# Patient Record
Sex: FEMALE | URBAN - METROPOLITAN AREA
[De-identification: names, ages, dates, MRNs, and addresses within clinical notes are randomized per-mention and may not be internally consistent; named-entity substitution may affect disease eponyms.]

---

## 2023-05-02 ENCOUNTER — Encounter (OUTPATIENT)
Facility: LOCATION | Age: 68
End: 2023-05-02
Payer: MEDICARE

## 2023-05-02 ENCOUNTER — PROCEDURE (OUTPATIENT)
Facility: LOCATION | Age: 68
End: 2023-05-02
Payer: MEDICARE

## 2023-05-02 PROCEDURE — 68841 INSJ RX ELUT IMPLT LAC CANAL: CPT | Performed by: OPHTHALMOLOGY

## 2023-05-02 PROCEDURE — 66984 XCAPSL CTRC RMVL W/O ECP: CPT | Performed by: OPHTHALMOLOGY

## 2023-05-03 DIAGNOSIS — Z96.1 PRESENCE OF INTRAOCULAR LENS: Primary | ICD-10-CM

## 2023-05-03 PROCEDURE — 99024 POSTOP FOLLOW-UP VISIT: CPT | Performed by: OPTOMETRIST

## 2023-05-03 ASSESSMENT — INTRAOCULAR PRESSURE: OS: 17

## 2023-05-03 NOTE — IMPRESSION/PLAN
Impression: S/P CE/IOL Standard Monofocal Goal Low Myopia due to astigmatism Dextenza OS - 1 Day. Presence of intraocular lens  Z96.1.
 s/p CEIOL OD/OS surgery doing well. OS:05/02/2023 PCIOL centered and clear. (-) Siedel sign, (-) suture (+) BCL OS. Plan: Patient to continue Moxifloxacin TID, Prednisolone Q2h, and Prolensa QD. AT's PRN

RTC as scheduled for 1 week follow up with Dr. Cheri Parsons. RTC PRN decrease VA, increase pain, redness, discharge, photophobia, flashes/floaters or other vision problems. Patient reminded of post-operative restrictions (do not rub eye, do not bend over, do not  more than 10-15 lbs, do not sleep on side of surgery, do not get water, dust or dirt in eye, wear plastic shield while sleeping). Explained to patient that bcl is present and may fall out on it's own before next appointment, patient to still continue the use of post ops drops as directed. Advised patient to RTC if any discomfort or pain.

## 2023-05-08 ENCOUNTER — POST-OPERATIVE VISIT (OUTPATIENT)
Facility: LOCATION | Age: 68
End: 2023-05-08
Payer: MEDICARE

## 2023-05-08 DIAGNOSIS — H25.811 COMBINED FORMS OF AGE-RELATED CATARACT, RIGHT EYE: ICD-10-CM

## 2023-05-08 DIAGNOSIS — Z48.810 ENCOUNTER FOR SURGICAL AFTERCARE FOLLOWING SURGERY ON A SENSE ORGAN: Primary | ICD-10-CM

## 2023-05-08 DIAGNOSIS — Z96.1 PRESENCE OF INTRAOCULAR LENS: ICD-10-CM

## 2023-05-08 PROCEDURE — 99024 POSTOP FOLLOW-UP VISIT: CPT | Performed by: OPHTHALMOLOGY

## 2023-05-08 PROCEDURE — 92134 CPTRZ OPH DX IMG PST SGM RTA: CPT | Performed by: OPHTHALMOLOGY

## 2023-05-08 ASSESSMENT — VISUAL ACUITY: OS: 20/50

## 2023-05-08 ASSESSMENT — INTRAOCULAR PRESSURE: OS: 19

## 2023-05-08 NOTE — IMPRESSION/PLAN
Impression: S/P CE/IOL Standard Monofocal Goal Low Myopia due to astigmatism Dextenza OS - 6 Days. Encounter for surgical aftercare following surgery on a sense organ  Z48.810. Plan: D/c Moxifloxacin. Continue Prolensa QD OS x 3 weeks then stop. Taper Prednisolone to BID OS x 3 weeks then stop. Updated medication schedule reviewed and given to pt. All restrictions lifted. Once doing well patient to see Dr. Estela Brothers soon after for continued post-operative care. Explained to pt that she has the option of having LVC enhancement in the future if needed. Pt expresses understanding. RTC PRN decrease VA, increase pain, redness, discharge, photophobia, flashes/floaters, or other vision problems.

## 2023-05-08 NOTE — IMPRESSION/PLAN
Impression: Combined forms of age-related cataract, right eye: H25.811. Plan: Discussed R/B/A of cataract surgery including risk of bleeding, infection, decreased BCVA, loss of eye. No guarantees, possible need  for further surgery. Patient expressed good understanding and wishes to proceed with CE/IOL OD. Patient elects to have cataract surgery + Vivity Trifocal Toric + FEMTO + ORA OD. Second eye consents signed today. Plan: OD on 5/9/23.

## 2023-05-09 ENCOUNTER — PROCEDURE (OUTPATIENT)
Facility: LOCATION | Age: 68
End: 2023-05-09
Payer: MEDICARE

## 2023-05-10 ENCOUNTER — POST-OPERATIVE VISIT (OUTPATIENT)
Facility: LOCATION | Age: 68
End: 2023-05-10
Payer: MEDICARE

## 2023-05-10 PROCEDURE — 99024 POSTOP FOLLOW-UP VISIT: CPT | Performed by: OPTOMETRIST

## 2023-05-10 ASSESSMENT — INTRAOCULAR PRESSURE
OS: 20
OD: 18

## 2023-05-10 NOTE — IMPRESSION/PLAN
Impression: S/P CE/IOL Vivity Multifocal FEMTO ORA Goal -0.3 to -0.6 OD - 1 Day. Presence of intraocular lens  Z96.1. s/p CEIOL OD/OS surgery doing well. OD: 05/06/223 OS: 05/02/2023 PCIOL centered and clear. (-) Siedel sign, (-) suture ( -) BCL OU Plan: Patient to continue OS: Moxifloxacin TID, Prednisolone Q2h, and Prolensa QD. OU: AT's QID. RTC as scheduled for 1 week follow up with Dr. Howard Isidro on 5/17/2023. RTC PRN decrease VA, increase pain, redness, discharge, photophobia, flashes/floaters or other vision problems. Patient reminded of post-operative restrictions (do not rub eye, do not bend over, do not  more than 10-15 lbs, do not sleep on side of surgery, do not get water, dust or dirt in eye, wear plastic shield while sleeping).

## 2023-05-17 ENCOUNTER — POST-OPERATIVE VISIT (OUTPATIENT)
Facility: LOCATION | Age: 68
End: 2023-05-17
Payer: MEDICARE

## 2023-05-17 DIAGNOSIS — Z96.1 PRESENCE OF INTRAOCULAR LENS: Primary | ICD-10-CM

## 2023-05-17 PROCEDURE — 99024 POSTOP FOLLOW-UP VISIT: CPT | Performed by: OPHTHALMOLOGY

## 2023-05-17 ASSESSMENT — INTRAOCULAR PRESSURE
OD: 11
OS: 14

## 2023-05-17 ASSESSMENT — VISUAL ACUITY: OD: 20/25

## 2023-05-17 NOTE — IMPRESSION/PLAN
Impression: S/P CE/IOL Vivity Multifocal FEMTO ORA Goal -0.3 to -0.6 OD - 8 Days. Presence of intraocular lens  Z96.1. Plan: Stop Moxifloxacin. Decrease Prednisolone BID OD. Continue Prolensa QD OD. RTC 1 month - check VA, Ks, MR, IOP and re-evaluation of operated eye. All restrictions discontinued. Patient able to resume all normal activities. Pt to see Dr. Mckeon Both OD 1-2 weeks after for continued post-operative care and annual eye health and vision exams. RTC PRN decrease VA, increase pain, redness, discharge, photophobia, flashes/floaters, or other vision problems.

## 2023-07-12 ENCOUNTER — POST-OPERATIVE VISIT (OUTPATIENT)
Facility: LOCATION | Age: 68
End: 2023-07-12
Payer: MEDICARE

## 2023-07-12 DIAGNOSIS — H16.223 KERATOCONJUNCT SICCA, NOT SPECIFIED AS SJOGREN'S, BILATERAL: ICD-10-CM

## 2023-07-12 DIAGNOSIS — H04.123 DRY EYE SYNDROME OF BILATERAL LACRIMAL GLANDS: ICD-10-CM

## 2023-07-12 DIAGNOSIS — Z96.1 PRESENCE OF INTRAOCULAR LENS: Primary | ICD-10-CM

## 2023-07-12 PROCEDURE — 99024 POSTOP FOLLOW-UP VISIT: CPT | Performed by: OPHTHALMOLOGY

## 2023-07-12 PROCEDURE — 92134 CPTRZ OPH DX IMG PST SGM RTA: CPT | Performed by: OPHTHALMOLOGY

## 2023-07-12 PROCEDURE — 92015 DETERMINE REFRACTIVE STATE: CPT | Performed by: OPHTHALMOLOGY

## 2023-07-12 ASSESSMENT — INTRAOCULAR PRESSURE
OD: 11
OS: 13

## 2023-07-12 ASSESSMENT — VISUAL ACUITY
OD: 20/25
OS: 20/60

## 2023-07-12 NOTE — IMPRESSION/PLAN
Impression: Keratoconjunct sicca, not specified as Sjogren's, bilateral: Y55.542. Plan: See dry eye plan.

## 2023-07-12 NOTE — IMPRESSION/PLAN
Impression: Dry eye syndrome of bilateral lacrimal glands: H04.123. Patient complains of discomfort/pressire when using the computer for a long period of time. Explained to patient that her symptoms are due to dry eyes. Plan: Encouraged patient to place PFATs near her workstation and instill one drop every 1 hour of screen time. RTC in 6 weeks for deb x 2 and ext x 2 with Dr. Petra Healy.

## 2023-07-12 NOTE — IMPRESSION/PLAN
Impression: S/P CE/IOL Vivity Multifocal FEMTO ORA Goal -0.3 to -0.6 OD - 64 Days. Presence of intraocular lens  Z96.1. Plan: Patient is released to Dr. Mike Ridley for primary eye care.

## 2023-08-23 ENCOUNTER — PROCEDURE (OUTPATIENT)
Facility: LOCATION | Age: 68
End: 2023-08-23
Payer: MEDICARE

## 2023-08-23 DIAGNOSIS — H16.223 KERATOCONJUNCT SICCA, NOT SPECIFIED AS SJOGREN'S, BILATERAL: ICD-10-CM

## 2023-08-23 DIAGNOSIS — H04.123 DRY EYE SYNDROME OF BILATERAL LACRIMAL GLANDS: Primary | ICD-10-CM

## 2023-08-23 RX ORDER — VARENICLINE 0.03 MG/.05ML
SPRAY NASAL
Qty: 25.2 | Refills: 3 | Status: ACTIVE
Start: 2023-08-23

## 2023-08-23 ASSESSMENT — INTRAOCULAR PRESSURE
OD: 10
OS: 13

## 2023-10-02 ENCOUNTER — OFFICE VISIT (OUTPATIENT)
Facility: LOCATION | Age: 68
End: 2023-10-02
Payer: MEDICARE

## 2023-10-02 DIAGNOSIS — H04.123 DRY EYE SYNDROME OF BILATERAL LACRIMAL GLANDS: Primary | ICD-10-CM

## 2023-10-02 DIAGNOSIS — H16.223 KERATOCONJUNCT SICCA, NOT SPECIFIED AS SJOGREN'S, BILATERAL: ICD-10-CM

## 2023-10-02 PROCEDURE — 68761 CLOSE TEAR DUCT OPENING: CPT | Performed by: OPHTHALMOLOGY

## 2023-10-02 ASSESSMENT — INTRAOCULAR PRESSURE
OD: 10
OS: 12

## 2024-01-22 ENCOUNTER — OFFICE VISIT (OUTPATIENT)
Facility: LOCATION | Age: 69
End: 2024-01-22
Payer: MEDICARE

## 2024-01-22 DIAGNOSIS — H16.223 KERATOCONJUNCT SICCA, NOT SPECIFIED AS SJOGREN'S, BILATERAL: ICD-10-CM

## 2024-01-22 DIAGNOSIS — H04.123 DRY EYE SYNDROME OF BILATERAL LACRIMAL GLANDS: Primary | ICD-10-CM

## 2024-01-22 PROCEDURE — 68761 CLOSE TEAR DUCT OPENING: CPT | Performed by: OPHTHALMOLOGY

## 2024-01-22 PROCEDURE — 99213 OFFICE O/P EST LOW 20 MIN: CPT | Performed by: OPHTHALMOLOGY

## 2024-01-22 ASSESSMENT — INTRAOCULAR PRESSURE
OD: 9
OS: 12

## 2024-04-17 ENCOUNTER — OFFICE VISIT (OUTPATIENT)
Facility: LOCATION | Age: 69
End: 2024-04-17
Payer: MEDICARE

## 2024-04-17 DIAGNOSIS — H04.123 DRY EYE SYNDROME OF BILATERAL LACRIMAL GLANDS: Primary | ICD-10-CM

## 2024-04-17 DIAGNOSIS — H16.223 KERATOCONJUNCT SICCA, NOT SPECIFIED AS SJOGREN'S, BILATERAL: ICD-10-CM

## 2024-04-17 PROCEDURE — 68761 CLOSE TEAR DUCT OPENING: CPT | Performed by: OPHTHALMOLOGY

## 2024-04-17 RX ORDER — PERFLUOROHEXYLOCTANE 1 MG/MG
100 % SOLUTION OPHTHALMIC
Qty: 3 | Refills: 3 | Status: ACTIVE
Start: 2024-04-17

## 2024-04-17 ASSESSMENT — INTRAOCULAR PRESSURE
OS: 11
OD: 9